# Patient Record
Sex: MALE | URBAN - METROPOLITAN AREA
[De-identification: names, ages, dates, MRNs, and addresses within clinical notes are randomized per-mention and may not be internally consistent; named-entity substitution may affect disease eponyms.]

---

## 2017-12-09 ENCOUNTER — OFFICE VISIT (OUTPATIENT)
Dept: OCCUPATIONAL MEDICINE | Age: 22
End: 2017-12-09
Attending: PHYSICIAN ASSISTANT

## 2017-12-09 ENCOUNTER — HOSPITAL ENCOUNTER (OUTPATIENT)
Dept: GENERAL RADIOLOGY | Age: 22
Discharge: HOME | End: 2017-12-09
Attending: PHYSICIAN ASSISTANT

## 2017-12-09 DIAGNOSIS — Z11.1 SCREENING-PULMONARY TB: ICD-10-CM

## 2017-12-09 DIAGNOSIS — Z11.1 SCREENING-PULMONARY TB: Primary | ICD-10-CM

## 2024-07-10 ENCOUNTER — PATIENT MESSAGE (OUTPATIENT)
Dept: INTERNAL MEDICINE CLINIC | Facility: CLINIC | Age: 29
End: 2024-07-10

## 2024-07-10 NOTE — TELEPHONE ENCOUNTER
From: Juan Pablo Goddard  To: Nickie Flores  Sent: 7/10/2024 11:52 AM CDT  Subject: Appointment for Juan Pablo Cordero I am Emily's nephew, I am trying to get an appt but it's showing till next year February! Is it possible to get an appt sooner this month? Thanks! Hope to you hear from you soon!

## 2024-07-17 ENCOUNTER — TELEPHONE (OUTPATIENT)
Dept: INTERNAL MEDICINE CLINIC | Facility: CLINIC | Age: 29
End: 2024-07-17

## 2024-07-17 ENCOUNTER — OFFICE VISIT (OUTPATIENT)
Dept: INTERNAL MEDICINE CLINIC | Facility: CLINIC | Age: 29
End: 2024-07-17
Payer: COMMERCIAL

## 2024-07-17 VITALS
RESPIRATION RATE: 18 BRPM | HEART RATE: 78 BPM | DIASTOLIC BLOOD PRESSURE: 80 MMHG | SYSTOLIC BLOOD PRESSURE: 128 MMHG | HEIGHT: 68.5 IN | WEIGHT: 315 LBS | BODY MASS INDEX: 47.19 KG/M2

## 2024-07-17 DIAGNOSIS — Z83.3 FAMILY HISTORY OF DIABETES MELLITUS: ICD-10-CM

## 2024-07-17 DIAGNOSIS — E66.01 MORBID OBESITY (HCC): ICD-10-CM

## 2024-07-17 DIAGNOSIS — Z72.3 LACK OF PHYSICAL EXERCISE: ICD-10-CM

## 2024-07-17 DIAGNOSIS — R53.83 OTHER FATIGUE: ICD-10-CM

## 2024-07-17 DIAGNOSIS — R06.81 APNEA: ICD-10-CM

## 2024-07-17 DIAGNOSIS — R06.83 SNORING: ICD-10-CM

## 2024-07-17 DIAGNOSIS — R40.0 DAYTIME SOMNOLENCE: ICD-10-CM

## 2024-07-17 DIAGNOSIS — Z51.81 THERAPEUTIC DRUG MONITORING: Primary | ICD-10-CM

## 2024-07-17 PROBLEM — J45.990 EXERCISE-INDUCED ASTHMA (HCC): Status: ACTIVE | Noted: 2024-05-09

## 2024-07-17 PROCEDURE — 99204 OFFICE O/P NEW MOD 45 MIN: CPT | Performed by: INTERNAL MEDICINE

## 2024-07-17 RX ORDER — ALBUTEROL SULFATE 90 UG/1
2 AEROSOL, METERED RESPIRATORY (INHALATION) EVERY 4 HOURS PRN
COMMUNITY
Start: 2024-06-12

## 2024-07-17 NOTE — PROGRESS NOTES
Patient teaching on Wegovy performed. Patient demonstrated back, all questions were answered and patient verbalized understanding. ERNESTINA

## 2024-07-17 NOTE — PROGRESS NOTES
HISTORY OF PRESENT ILLNESS  Chief Complaint   Patient presents with    Weight Problem     Ref by Aunt, tried diets, and gym.       Juan Pablo Goddard is a 29 year old male new to our office today for initiation of medical weight loss program.  Patient presents today with c/o excess weight.     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  Works as DJ, late night / late night eating   for 3 years  Sister is getting  next year     Felt that he did well till college   Was at 170 precollege, after college [id number]   Since that time went for Masters   Weight continues to increase   Last year went to the gym 4 months straight   Lost about 10 lb   Has been not been in gym     Reason/goal for weight loss: lost of 50 lb     Previous weight loss efforts in the past/medication: diet and exercise     Interest in Bariatric surgery: consider weight loss surgery     Barriers to weight loss: as above     Wt Readings from Last 6 Encounters:   07/17/24 (!) 397 lb (180.1 kg)            Breakfast Lunch Dinner Snacks Fluids   coffee [employer] /  Hot dog   Burger    Wife cooks health for dinner    Sweet snack at night         Sweeth tooth   Feels that he doesn't struggle with hunger but eats throughout the day / snacking     Social hx and lifestyle reviewed:    Work: dj   Marital status: yes   Support: good   Tobacco use: neg   ETOH use: weekends   Supplements: negative  Labwork: feb   Exercise: none   Stress level: negative /10  Sleep hours and integrity: snoring  / headache       MEDICAL HISTORY  PMH reviewed:   Cardiac disorders:negative    Depression/anxiety: negative   Glaucoma: negative   Kidney stones: negative   Eating disorder: negative   Migraines: negative   Seizures: negative   Joint-related conditions:negative   Liver disease: negative   Renal disease: negative   Diabetes: negative  Thyroid disease: negative   Constipation: negative  DVT: negative    Family or personal history of Pancreatic issues / Medullary Thyroid  Cancer: negative    History of bariatric surgery: negative     FMH reviewed: obesity in parent/s or sibling: YES     REVIEW OF SYSTEMS  GENERAL: feels well otherwise,   NECK: denies thickening   LUNGS: denies shortness of breath with exertion, no apnea   CARDIOVASCULAR: denies chest pain on exertion , denies palpitations or pedal edema  GI: denies abdominal pain.  No N/V/D/C  MUSCULOSKELETAL: denies joint pains   NEURO: denies headaches   PSYCH: denies change in behavior or mood, denies feeling sad or depressed     EXAM  /80   Pulse 78   Resp 18   Ht 5' 8.5\" (1.74 m)   Wt (!) 397 lb (180.1 kg)   BMI 59.49 kg/m² ,  GENERAL: well developed, well nourished, in no apparent distress  SKIN: warm, pink, dry without rashes to exposed area   EYES: conjunctiva pink, sclera non icteric, PERRL  HEENT: atraumatic, normocephalic, O/p: Mallampati score- 2  NECK: supple, non tender, no adenopathy, no thyromegaly,   LUNGS: CTA in all fields, breathing non labored  CARDIO: RRR without murmur, normal S1 and S2 without clicks or gallops, no pedal edema.GI: rotund, no masses, HSM or tenderness  MUSCULOSKELETAL: grossly intact   NEURO: Oriented times three, full ROM of bilateral UE/LE  PSYCH: pleasant, cooperative, normal mood and affect,     No results found for: \"WBC\", \"RBC\", \"HGB\", \"HCT\", \"MCV\", \"MCH\", \"MCHC\", \"RDW\", \"PLT\", \"MPV\"  No results found for: \"GLU\", \"BUN\", \"BUNCREA\", \"CREATSERUM\", \"ANIONGAP\", \"GFR\", \"GFRNAA\", \"GFRAA\", \"CA\", \"OSMOCALC\", \"ALKPHO\", \"AST\", \"ALT\", \"ALKPHOS\", \"BILT\", \"TP\", \"ALB\", \"GLOBULIN\", \"AGRATIO\", \"NA\", \"K\", \"CL\", \"CO2\"  No results found for: \"EAG\", \"A1C\"  No results found for: \"CHOLEST\", \"TRIG\", \"HDL\", \"LDL\", \"VLDL\", \"TCHDLRATIO\", \"NONHDLC\", \"CHOLHDLRATIO\", \"CALCNONHDL\"  No results found for: \"T4F\", \"TSH\", \"TSHT4\"  No results found for: \"B12\", \"VITB12\"  No results found for: \"VITD\", \"QVITD\", \"TEIX72YB\"    Current Outpatient Medications on File Prior to Visit   Medication Sig Dispense Refill     albuterol 108 (90 Base) MCG/ACT Inhalation Aero Soln Inhale 2 puffs into the lungs every 4 (four) hours as needed.       No current facility-administered medications on file prior to visit.       ASSESSMENT  Initial Weight Data and Goal Weight Loss:       Diagnoses and all orders for this visit:    Therapeutic drug monitoring  -     OP REFERRAL TO DIAGNOSTIC SLEEP STUDY  -     B12 AND FOLATE  -     VITAMIN D, 25-HYDROXY [[id number]][Q]  -     [facility] DIETITIAN - [place] [facility]    Morbid obesity (HCC)  -     OP REFERRAL TO DIAGNOSTIC SLEEP STUDY  -     B12 AND FOLATE  -     VITAMIN D, 25-HYDROXY [[id number]][Q]  -     [facility] DIETIT[facility] - [place] Location    Family history of diabetes mellitus  -     OP REFERRAL TO DIAGNOSTIC SLEEP STUDY  -     B12 AND FOLATE  -     VITAMIN D, 25-HYDROXY [[id number]][Q]  -     [facility] DIETIT[facility] - [place] [facility]    Snoring  -     OP REFERRAL TO DIAGNOSTIC SLEEP STUDY  -     B12 AND FOLATE  -     VITAMIN D, 25-HYDROXY [[id number]][Q]  -     [facility] DIETIT[facility] - [place] [facility]    Apnea  -     OP REFERRAL TO DIAGNOSTIC SLEEP STUDY  -     B12 AND FOLATE  -     VITAMIN D, 25-HYDROXY [[id number]][Q]  -     [facility] DI[facility] - [place] [facility]    Other fatigue  -     OP REFERRAL TO DIAGNOSTIC SLEEP STUDY  -     B12 AND FOLATE  -     VITAMIN D, 25-HYDROXY [[id number]][Q]  -     [facility] DI[facility] - [place] [facility]    Daytime somnolence  -     OP REFERRAL TO DIAGNOSTIC SLEEP STUDY  -     B12 AND FOLATE  -     VITAMIN D, 25-HYDROXY [[id number]][Q]  -     [facility] DI[facility]IT[facility] - [place] [facility]    Lack of physical exercise    Other orders  -     semaglutide-weight management 0.5  MG/0.5ML Subcutaneous Solution Auto-injector; Inject 0.5 mL (0.5 mg total) into the skin once a week for 4 doses.  -     semaglutide-weight management 0.25 MG/0.5ML Subcutaneous Solution Auto-injector; Inject 0.5 mL (0.25 mg total) into the skin once a week for 4 doses.        PLAN     Body mass index is 59.49 kg/m².  Medication use and side effects reviewed with patient.  Medication contraindications: n/a   Trial of wegovy   -advised of side effects and adverse effects of this medication  Check sleep study   Obtain labs from PCP   Reviewed most recent A1c was normal   Consider bariatric surgery   Referral to dietitian     Follow up with dietitian and psychologist as recommended.  Discussed the role of sleep and stress in weight management.  Labs orders as above.  Counseled on comprehensive weight loss plan including attention to nutrition, exercise and behavior/stress management for success. See patient instruction below for more details.  Weight Loss Consent to treat reviewed and signed.    There are no Patient Instructions on file for this visit.    No follow-ups on file.    Patient verbalizes understanding.    Nickie Flores MD

## 2024-07-23 LAB
FOLATE, SERUM: 11.4 NG/ML
VITAMIN B12: 402 PG/ML (ref 200–1100)
VITAMIN D, 25-OH, TOTAL: 15 NG/ML (ref 30–100)

## 2024-07-23 NOTE — TELEPHONE ENCOUNTER
Can he check for coverage of zepbound?  If not covered would patient like to pay out of pocket?

## 2024-08-07 ENCOUNTER — PATIENT MESSAGE (OUTPATIENT)
Dept: INTERNAL MEDICINE CLINIC | Facility: CLINIC | Age: 29
End: 2024-08-07

## 2024-08-07 DIAGNOSIS — E66.01 MORBID OBESITY (HCC): Primary | ICD-10-CM

## 2024-08-07 DIAGNOSIS — Z51.81 THERAPEUTIC DRUG MONITORING: ICD-10-CM

## 2024-08-08 ENCOUNTER — TELEPHONE (OUTPATIENT)
Dept: INTERNAL MEDICINE CLINIC | Facility: CLINIC | Age: 29
End: 2024-08-08

## 2024-08-08 NOTE — TELEPHONE ENCOUNTER
I called patient as he left a message to call him back.  He wants to take wegovy and I explained that if his insurance does not cover weight loss drugs it will run him up to $1,300 a month for it out of pocket.  He said it has been working so well.  I advised there is compound med but that would cost about 200 a month and that was too expensive for him.  He will get the EKG and hope that phentermine will help.  He will call and schedule.  Discussed why we check the EKG .  Patient verbalized understanding.

## 2024-08-08 NOTE — TELEPHONE ENCOUNTER
From: Juan Pablo Goddard  To: Nickie Flores  Sent: 8/7/2024 3:28 PM CDT  Subject: WeGoblaze Rubalcava, So I asked my new insurance, they don't cover Wegoovy for weight loss! They said they need like a diabetes code or something with cardiovascular coding? Is it possible to use one of those codes for wegoovy?